# Patient Record
Sex: MALE | Race: WHITE | Employment: OTHER | URBAN - METROPOLITAN AREA
[De-identification: names, ages, dates, MRNs, and addresses within clinical notes are randomized per-mention and may not be internally consistent; named-entity substitution may affect disease eponyms.]

---

## 2018-05-22 ENCOUNTER — OFFICE VISIT (OUTPATIENT)
Dept: CARDIOLOGY CLINIC | Age: 78
End: 2018-05-22
Payer: MEDICARE

## 2018-05-22 VITALS
DIASTOLIC BLOOD PRESSURE: 82 MMHG | HEART RATE: 50 BPM | HEIGHT: 70 IN | WEIGHT: 216 LBS | RESPIRATION RATE: 16 BRPM | SYSTOLIC BLOOD PRESSURE: 128 MMHG | BODY MASS INDEX: 30.92 KG/M2

## 2018-05-22 DIAGNOSIS — R42 LIGHTHEADEDNESS: ICD-10-CM

## 2018-05-22 DIAGNOSIS — I10 ESSENTIAL HYPERTENSION: ICD-10-CM

## 2018-05-22 DIAGNOSIS — E78.2 MIXED HYPERLIPIDEMIA: ICD-10-CM

## 2018-05-22 PROCEDURE — 99204 OFFICE O/P NEW MOD 45 MIN: CPT | Performed by: INTERNAL MEDICINE

## 2018-05-22 PROCEDURE — 93000 ELECTROCARDIOGRAM COMPLETE: CPT | Performed by: INTERNAL MEDICINE

## 2018-05-22 RX ORDER — LISINOPRIL 20 MG/1
20 TABLET ORAL 2 TIMES DAILY
Refills: 3 | COMMUNITY
Start: 2018-05-07

## 2018-05-30 DIAGNOSIS — R42 LIGHTHEADEDNESS: ICD-10-CM

## 2018-05-31 ENCOUNTER — TELEPHONE (OUTPATIENT)
Dept: CARDIOLOGY CLINIC | Age: 78
End: 2018-05-31

## 2021-07-06 ENCOUNTER — HOSPITAL ENCOUNTER (OUTPATIENT)
Age: 81
Discharge: HOME | End: 2021-07-08
Payer: MEDICARE

## 2021-07-06 ENCOUNTER — HOSPITAL ENCOUNTER (OUTPATIENT)
Dept: ULTRASOUND IMAGING | Age: 81
Discharge: HOME | End: 2021-07-08
Payer: MEDICARE

## 2021-07-06 DIAGNOSIS — R94.4 ABNORMAL CREATININE CLEARANCE GLOMERULAR FILTRATION: ICD-10-CM

## 2021-07-06 PROCEDURE — 76770 US EXAM ABDO BACK WALL COMP: CPT

## 2022-07-16 ENCOUNTER — HOSPITAL ENCOUNTER (OUTPATIENT)
Age: 82
Discharge: HOME | End: 2022-07-16
Payer: MEDICARE

## 2022-07-16 LAB — PROSTATE SPECIFIC ANTIGEN: <0.01 NG/ML (ref 0–4)

## 2022-07-16 PROCEDURE — 36415 COLL VENOUS BLD VENIPUNCTURE: CPT

## 2022-07-16 PROCEDURE — G0103 PSA SCREENING: HCPCS

## 2022-08-15 ENCOUNTER — TELEPHONE (OUTPATIENT)
Dept: ADMINISTRATIVE | Age: 82
End: 2022-08-15

## 2022-08-15 NOTE — TELEPHONE ENCOUNTER
Patient Appointment Form:      PCP: Bea Jha  Referring: pcp    Has the Patient:    Seen a Cardiologist? yes    date:5/22/2018  [de-identified]  location:[place]    Had a heart catheterization? no    Had heart surgery? no    Had a stress test or nuclear stress test? no    Had an echocardiogram? no    Had a vascular ultrasound? no    Had a 24/48 heart monitor or extended cardiac event monitor?  48hr   date: 5/2018      Who ordered: Gloria Winn    Had recent blood work in the last 6 months? no    Had a pacemaker/ICD/ILR implant? no    Seen an Electrophysiologist? no        Will send records via: in [address] [place] [address] Rd      Date & time of appointment:  isac michel/ Gloria Winn 8/25 at 12:45, no recent cardio history

## 2022-08-25 ENCOUNTER — OFFICE VISIT (OUTPATIENT)
Dept: CARDIOLOGY CLINIC | Age: 82
End: 2022-08-25
Payer: MEDICARE

## 2022-08-25 VITALS
WEIGHT: 192 LBS | BODY MASS INDEX: 27.49 KG/M2 | HEIGHT: 70 IN | DIASTOLIC BLOOD PRESSURE: 69 MMHG | HEART RATE: 59 BPM | RESPIRATION RATE: 12 BRPM | SYSTOLIC BLOOD PRESSURE: 118 MMHG

## 2022-08-25 DIAGNOSIS — I10 ESSENTIAL HYPERTENSION: Primary | ICD-10-CM

## 2022-08-25 PROCEDURE — 1123F ACP DISCUSS/DSCN MKR DOCD: CPT | Performed by: INTERNAL MEDICINE

## 2022-08-25 PROCEDURE — 99204 OFFICE O/P NEW MOD 45 MIN: CPT | Performed by: INTERNAL MEDICINE

## 2022-08-25 PROCEDURE — 93000 ELECTROCARDIOGRAM COMPLETE: CPT | Performed by: INTERNAL MEDICINE

## 2022-08-25 RX ORDER — L-METHYLFOLATE-ALGAE-VIT B12-B6 CAP 3-90.314-2-35 MG 3-90.314-2-35 MG
1 CAP ORAL 2 TIMES DAILY
COMMUNITY

## 2022-08-25 NOTE — PROGRESS NOTES
Chief Complaint   Patient presents with    Heart Problem       Patient Active Problem List    Diagnosis Date Noted    Mixed hyperlipidemia 05/22/2018    Essential hypertension 05/22/2018    Lightheadedness 05/22/2018     Overview Note:     CT head normal          Current Outpatient Medications   Medication Sig Dispense Refill    L-methylfolate-B6-B12 (METANX) 3-90.314-2-35 MG CAPS capsule Take 1 capsule by mouth in the morning and at bedtime      lisinopril (PRINIVIL;ZESTRIL) 20 MG tablet Take 20 mg by mouth in the morning and 20 mg in the evening. 3    rosuvastatin (CRESTOR) 40 MG tablet Take 40 mg by mouth every evening      levothyroxine (SYNTHROID) 50 MCG tablet Take 50 mcg by mouth Daily      Cholecalciferol (VITAMIN D3) 2000 units CAPS Take 4,000 Units by mouth       No current facility-administered medications for this visit. No Known Allergies    Vitals:    08/25/22 1232   BP: 118/69   Pulse: 59   Resp: 12   Weight: 192 lb (87.1 kg)   Height: 5' 10\" (1.778 m)       Social History     Socioeconomic History    Marital status:      Spouse name: Not on file    Number of children: Not on file    Years of education: Not on file    Highest education level: Not on file   Occupational History    Not on file   Tobacco Use    Smoking status: Former    Smokeless tobacco: Never   Substance and Sexual Activity    Alcohol use: No    Drug use: No    Sexual activity: Not on file   Other Topics Concern    Not on file   Social History Narrative    Not on file     Social Determinants of Health     Financial Resource Strain: Not on file   Food Insecurity: Not on file   Transportation Needs: Not on file   Physical Activity: Not on file   Stress: Not on file   Social Connections: Not on file   Intimate Partner Violence: Not on file   Housing Stability: Not on file       History reviewed. No pertinent family history.       SUBJECTIVE: Damaris Fry presents to the office today for consult ( dr. Shani Donahue) for evaluation of single episode of lightheadedness . I saw him in 2018 for same - w/u with Holter with no causative finding ( did have 2 sec pause),but history was c/w orthostatis. Had a single episode recently that lasted for 36 hours, lightheaded when he would stand, admitting to bourbon usage  He has limiations due to back issues/ radiculopathy, but does exercise biker  Is a drinker, but not heavy. commments on a similar episode a while back while drinking in the sun when he felt near syncope. No palps now, no angina. Physical Exam   /69   Pulse 59   Resp 12   Ht 5' 10\" (1.778 m)   Wt 192 lb (87.1 kg)   BMI 27.55 kg/m²   Constitutional: Oriented to person, place, and time. Well-developed and well-nourished. No distress. Head: Normocephalic and atraumatic. Eyes: EOM are normal. Pupils are equal, round, and reactive to light. Neck: Normal range of motion. Neck supple. No hepatojugular reflux and no JVD present. Carotid bruit is not present. No tracheal deviation present. No thyromegaly present. Cardiovascular: Normal rate, regular rhythm, normal heart sounds and intact distal pulses. Exam reveals no gallop and no friction rub. No murmur heard. Pulmonary/Chest: Effort normal and breath sounds normal. No respiratory distress. No wheezes. No rales. No tenderness. Abdominal: Soft. Bowel sounds are normal. No distension and no mass. No tenderness. No rebound and no guarding. Musculoskeletal: Normal range of motion. No edema and no tenderness. Neurological: Alert and oriented to person, place, and time. Skin: Skin is warm and dry. No rash noted. Not diaphoretic. No erythema. Psychiatric: Normal mood and affect.  Behavior is normal.     EKG:  sinus bradycardia, low voltage    ASSESSMENT AND PLAN:  Patient Active Problem List   Diagnosis    Mixed hyperlipidemia    Essential hypertension    Lightheadedness     Symptoms most c/w orthostasis, with contributing factors of high dose ACE, relative volume depletion  CV exam and ekg unchanged  Advised to start documenting his home BP readings, as he may need reduction in ACE dose  Also advised on need to limit alcohol and caffeine, and to hydrate  Recurrence would suggest MCOT advisable      Tahir Kee M.D  [facility]

## 2023-12-13 ENCOUNTER — HOSPITAL ENCOUNTER (OUTPATIENT)
Age: 83
Discharge: HOME | End: 2023-12-15
Payer: MEDICARE

## 2023-12-13 ENCOUNTER — HOSPITAL ENCOUNTER (OUTPATIENT)
Dept: GENERAL RADIOLOGY | Age: 83
Discharge: HOME | End: 2023-12-15
Attending: FAMILY MEDICINE
Payer: MEDICARE

## 2023-12-13 DIAGNOSIS — M54.50 LOW BACK PAIN, UNSPECIFIED BACK PAIN LATERALITY, UNSPECIFIED CHRONICITY, UNSPECIFIED WHETHER SCIATICA PRESENT: ICD-10-CM

## 2023-12-13 PROCEDURE — 72110 X-RAY EXAM L-2 SPINE 4/>VWS: CPT

## 2024-05-15 ENCOUNTER — HOSPITAL ENCOUNTER (OUTPATIENT)
Age: 84
Discharge: HOME | End: 2024-05-17
Payer: MEDICARE

## 2024-05-15 ENCOUNTER — HOSPITAL ENCOUNTER (OUTPATIENT)
Dept: GENERAL RADIOLOGY | Age: 84
Discharge: HOME | End: 2024-05-17
Payer: MEDICARE

## 2024-05-15 DIAGNOSIS — M54.50 LOW BACK PAIN, UNSPECIFIED BACK PAIN LATERALITY, UNSPECIFIED CHRONICITY, UNSPECIFIED WHETHER SCIATICA PRESENT: ICD-10-CM

## 2024-05-15 DIAGNOSIS — M25.552 LEFT HIP PAIN: ICD-10-CM

## 2024-05-15 PROCEDURE — 72110 X-RAY EXAM L-2 SPINE 4/>VWS: CPT

## 2024-05-15 PROCEDURE — 73502 X-RAY EXAM HIP UNI 2-3 VIEWS: CPT

## 2024-06-06 ENCOUNTER — HOSPITAL ENCOUNTER (OUTPATIENT)
Age: 84
Discharge: HOME | End: 2024-06-08
Payer: MEDICARE

## 2024-06-06 ENCOUNTER — HOSPITAL ENCOUNTER (OUTPATIENT)
Dept: ULTRASOUND IMAGING | Age: 84
Discharge: HOME | End: 2024-06-08
Payer: MEDICARE

## 2024-06-06 DIAGNOSIS — E01.0 THYROMEGALY: ICD-10-CM

## 2024-06-06 PROCEDURE — 76536 US EXAM OF HEAD AND NECK: CPT

## 2024-06-19 ENCOUNTER — HOSPITAL ENCOUNTER (OUTPATIENT)
Dept: CT IMAGING | Age: 84
Discharge: HOME | End: 2024-06-21
Payer: MEDICARE

## 2024-06-19 DIAGNOSIS — M16.12 OSTEOARTHRITIS OF LEFT HIP, UNSPECIFIED OSTEOARTHRITIS TYPE: ICD-10-CM

## 2024-06-19 PROCEDURE — 73700 CT LOWER EXTREMITY W/O DYE: CPT

## 2024-11-13 ENCOUNTER — HOSPITAL ENCOUNTER (OUTPATIENT)
Age: 84
Discharge: HOME | End: 2024-11-15
Payer: MEDICARE

## 2024-11-13 ENCOUNTER — HOSPITAL ENCOUNTER (OUTPATIENT)
Dept: GENERAL RADIOLOGY | Age: 84
Discharge: HOME | End: 2024-11-15
Payer: MEDICARE

## 2024-11-13 DIAGNOSIS — M25.562 ARTHRALGIA OF LEFT LOWER LEG: ICD-10-CM

## 2024-11-13 PROCEDURE — 73564 X-RAY EXAM KNEE 4 OR MORE: CPT
